# Patient Record
Sex: MALE | Race: OTHER | HISPANIC OR LATINO | Employment: FULL TIME | URBAN - NONMETROPOLITAN AREA
[De-identification: names, ages, dates, MRNs, and addresses within clinical notes are randomized per-mention and may not be internally consistent; named-entity substitution may affect disease eponyms.]

---

## 2021-09-15 ENCOUNTER — HOSPITAL ENCOUNTER (EMERGENCY)
Facility: HOSPITAL | Age: 56
Discharge: HOME | End: 2021-09-15
Attending: EMERGENCY MEDICINE
Payer: COMMERCIAL

## 2021-09-15 ENCOUNTER — APPOINTMENT (EMERGENCY)
Dept: CT IMAGING | Facility: HOSPITAL | Age: 56
End: 2021-09-15
Payer: COMMERCIAL

## 2021-09-15 VITALS
RESPIRATION RATE: 18 BRPM | OXYGEN SATURATION: 97 % | TEMPERATURE: 97.4 F | SYSTOLIC BLOOD PRESSURE: 122 MMHG | WEIGHT: 263.45 LBS | DIASTOLIC BLOOD PRESSURE: 75 MMHG | HEART RATE: 56 BPM

## 2021-09-15 DIAGNOSIS — R10.9 ABDOMINAL PAIN: Primary | ICD-10-CM

## 2021-09-15 DIAGNOSIS — K40.20 BILATERAL INGUINAL HERNIA WITHOUT OBSTRUCTION OR GANGRENE, RECURRENCE NOT SPECIFIED: ICD-10-CM

## 2021-09-15 DIAGNOSIS — N20.0 RENAL CALCULI: ICD-10-CM

## 2021-09-15 DIAGNOSIS — R11.2 NAUSEA AND VOMITING: ICD-10-CM

## 2021-09-15 DIAGNOSIS — Z98.84 PERSONAL HISTORY OF GASTRIC BYPASS: ICD-10-CM

## 2021-09-15 DIAGNOSIS — N18.30 CKD (CHRONIC KIDNEY DISEASE) STAGE 3, GFR 30-59 ML/MIN (HCC): ICD-10-CM

## 2021-09-15 LAB
ALBUMIN SERPL BCP-MCNC: 3.9 G/DL (ref 3.5–5)
ALP SERPL-CCNC: 100 U/L (ref 46–116)
ALT SERPL W P-5'-P-CCNC: 19 U/L (ref 12–78)
AMORPH PHOS CRY URNS QL MICRO: ABNORMAL /HPF
AMORPH URATE CRY URNS QL MICRO: ABNORMAL /HPF
ANION GAP SERPL CALCULATED.3IONS-SCNC: 9 MMOL/L (ref 4–13)
AST SERPL W P-5'-P-CCNC: 13 U/L (ref 5–45)
BACTERIA UR QL AUTO: ABNORMAL /HPF
BASOPHILS # BLD AUTO: 0.05 THOUSANDS/ΜL (ref 0–0.1)
BASOPHILS NFR BLD AUTO: 1 % (ref 0–1)
BILIRUB SERPL-MCNC: 0.85 MG/DL (ref 0.2–1)
BILIRUB UR QL STRIP: ABNORMAL
BUN SERPL-MCNC: 31 MG/DL (ref 5–25)
CALCIUM SERPL-MCNC: 9.4 MG/DL (ref 8.3–10.1)
CAOX CRY URNS QL MICRO: ABNORMAL /HPF
CHLORIDE SERPL-SCNC: 106 MMOL/L (ref 100–108)
CLARITY UR: ABNORMAL
CO2 SERPL-SCNC: 26 MMOL/L (ref 21–32)
COLOR UR: YELLOW
CREAT SERPL-MCNC: 1.68 MG/DL (ref 0.6–1.3)
EOSINOPHIL # BLD AUTO: 0.19 THOUSAND/ΜL (ref 0–0.61)
EOSINOPHIL NFR BLD AUTO: 2 % (ref 0–6)
ERYTHROCYTE [DISTWIDTH] IN BLOOD BY AUTOMATED COUNT: 14.3 % (ref 11.6–15.1)
GFR SERPL CREATININE-BSD FRML MDRD: 45 ML/MIN/1.73SQ M
GLUCOSE SERPL-MCNC: 112 MG/DL (ref 65–140)
GLUCOSE UR STRIP-MCNC: NEGATIVE MG/DL
HCT VFR BLD AUTO: 40.6 % (ref 36.5–49.3)
HGB BLD-MCNC: 13.3 G/DL (ref 12–17)
HGB UR QL STRIP.AUTO: NEGATIVE
IMM GRANULOCYTES # BLD AUTO: 0.04 THOUSAND/UL (ref 0–0.2)
IMM GRANULOCYTES NFR BLD AUTO: 0 % (ref 0–2)
KETONES UR STRIP-MCNC: ABNORMAL MG/DL
LEUKOCYTE ESTERASE UR QL STRIP: NEGATIVE
LIPASE SERPL-CCNC: 139 U/L (ref 73–393)
LYMPHOCYTES # BLD AUTO: 1.84 THOUSANDS/ΜL (ref 0.6–4.47)
LYMPHOCYTES NFR BLD AUTO: 19 % (ref 14–44)
MAGNESIUM SERPL-MCNC: 2.5 MG/DL (ref 1.6–2.6)
MCH RBC QN AUTO: 28.2 PG (ref 26.8–34.3)
MCHC RBC AUTO-ENTMCNC: 32.8 G/DL (ref 31.4–37.4)
MCV RBC AUTO: 86 FL (ref 82–98)
MONOCYTES # BLD AUTO: 1.04 THOUSAND/ΜL (ref 0.17–1.22)
MONOCYTES NFR BLD AUTO: 11 % (ref 4–12)
NEUTROPHILS # BLD AUTO: 6.68 THOUSANDS/ΜL (ref 1.85–7.62)
NEUTS SEG NFR BLD AUTO: 67 % (ref 43–75)
NITRITE UR QL STRIP: NEGATIVE
NON-SQ EPI CELLS URNS QL MICRO: ABNORMAL /HPF
NRBC BLD AUTO-RTO: 0 /100 WBCS
PH UR STRIP.AUTO: 6 [PH]
PLATELET # BLD AUTO: 258 THOUSANDS/UL (ref 149–390)
PMV BLD AUTO: 10 FL (ref 8.9–12.7)
POTASSIUM SERPL-SCNC: 3.7 MMOL/L (ref 3.5–5.3)
PROT SERPL-MCNC: 7.9 G/DL (ref 6.4–8.2)
PROT UR STRIP-MCNC: ABNORMAL MG/DL
RBC # BLD AUTO: 4.72 MILLION/UL (ref 3.88–5.62)
RBC #/AREA URNS AUTO: ABNORMAL /HPF
SARS-COV-2 RNA RESP QL NAA+PROBE: NEGATIVE
SODIUM SERPL-SCNC: 141 MMOL/L (ref 136–145)
SP GR UR STRIP.AUTO: >=1.03 (ref 1–1.03)
TROPONIN I SERPL-MCNC: <0.02 NG/ML
UROBILINOGEN UR QL STRIP.AUTO: 1 E.U./DL
WBC # BLD AUTO: 9.84 THOUSAND/UL (ref 4.31–10.16)
WBC #/AREA URNS AUTO: ABNORMAL /HPF

## 2021-09-15 PROCEDURE — 36415 COLL VENOUS BLD VENIPUNCTURE: CPT | Performed by: PHYSICIAN ASSISTANT

## 2021-09-15 PROCEDURE — 96374 THER/PROPH/DIAG INJ IV PUSH: CPT

## 2021-09-15 PROCEDURE — 74177 CT ABD & PELVIS W/CONTRAST: CPT

## 2021-09-15 PROCEDURE — 83690 ASSAY OF LIPASE: CPT | Performed by: PHYSICIAN ASSISTANT

## 2021-09-15 PROCEDURE — 93005 ELECTROCARDIOGRAM TRACING: CPT

## 2021-09-15 PROCEDURE — 80053 COMPREHEN METABOLIC PANEL: CPT | Performed by: PHYSICIAN ASSISTANT

## 2021-09-15 PROCEDURE — 96361 HYDRATE IV INFUSION ADD-ON: CPT

## 2021-09-15 PROCEDURE — 96375 TX/PRO/DX INJ NEW DRUG ADDON: CPT

## 2021-09-15 PROCEDURE — U0005 INFEC AGEN DETEC AMPLI PROBE: HCPCS | Performed by: PHYSICIAN ASSISTANT

## 2021-09-15 PROCEDURE — 83735 ASSAY OF MAGNESIUM: CPT | Performed by: PHYSICIAN ASSISTANT

## 2021-09-15 PROCEDURE — 84484 ASSAY OF TROPONIN QUANT: CPT | Performed by: PHYSICIAN ASSISTANT

## 2021-09-15 PROCEDURE — G1004 CDSM NDSC: HCPCS

## 2021-09-15 PROCEDURE — 99284 EMERGENCY DEPT VISIT MOD MDM: CPT

## 2021-09-15 PROCEDURE — U0003 INFECTIOUS AGENT DETECTION BY NUCLEIC ACID (DNA OR RNA); SEVERE ACUTE RESPIRATORY SYNDROME CORONAVIRUS 2 (SARS-COV-2) (CORONAVIRUS DISEASE [COVID-19]), AMPLIFIED PROBE TECHNIQUE, MAKING USE OF HIGH THROUGHPUT TECHNOLOGIES AS DESCRIBED BY CMS-2020-01-R: HCPCS | Performed by: PHYSICIAN ASSISTANT

## 2021-09-15 PROCEDURE — 81001 URINALYSIS AUTO W/SCOPE: CPT | Performed by: PHYSICIAN ASSISTANT

## 2021-09-15 PROCEDURE — 99285 EMERGENCY DEPT VISIT HI MDM: CPT | Performed by: PHYSICIAN ASSISTANT

## 2021-09-15 PROCEDURE — 85025 COMPLETE CBC W/AUTO DIFF WBC: CPT | Performed by: PHYSICIAN ASSISTANT

## 2021-09-15 RX ORDER — ONDANSETRON 2 MG/ML
4 INJECTION INTRAMUSCULAR; INTRAVENOUS ONCE
Status: COMPLETED | OUTPATIENT
Start: 2021-09-15 | End: 2021-09-15

## 2021-09-15 RX ORDER — ONDANSETRON 4 MG/1
4 TABLET, ORALLY DISINTEGRATING ORAL EVERY 6 HOURS PRN
Qty: 12 TABLET | Refills: 0 | Status: SHIPPED | OUTPATIENT
Start: 2021-09-15

## 2021-09-15 RX ADMIN — SODIUM CHLORIDE 1000 ML: 0.9 INJECTION, SOLUTION INTRAVENOUS at 16:18

## 2021-09-15 RX ADMIN — SODIUM CHLORIDE 1000 ML: 0.9 INJECTION, SOLUTION INTRAVENOUS at 17:46

## 2021-09-15 RX ADMIN — IOHEXOL 100 ML: 350 INJECTION, SOLUTION INTRAVENOUS at 18:45

## 2021-09-15 RX ADMIN — FAMOTIDINE 20 MG: 10 INJECTION INTRAVENOUS at 16:18

## 2021-09-15 RX ADMIN — ONDANSETRON 4 MG: 2 INJECTION INTRAMUSCULAR; INTRAVENOUS at 16:17

## 2021-09-15 NOTE — DISCHARGE INSTRUCTIONS
Rest, plenty of fluids  Zofran as needed for nausea/vomiting  OTC tylenol as needed for fever/discomfort  Follow up with PCP or return to ER as needed

## 2021-09-15 NOTE — ED PROVIDER NOTES
History  Chief Complaint   Patient presents with    Fever - 9 weeks to 74 years     pt c/o abdominal pain, fever, headache, and weakness for the past week  pts fever this morning was 8      64year old male presents with spouse ambulatory from home for evaluation of not feeling well  Symptoms started last week  C/o fever, chills, headache, body aches  Slight cough  Also reports upper abdominal pain, nausea and vomiting  Denies diarrhea or constipation  Pain does not radiate  Denies chest pain, SOB  Denies any urinary complaints  Appetite has been decreased  He has been able to tolerate fluids  No reported aggravating or alleviating factors  No specific treatments tried  Denies sick contacts  Denies recent travel  PMH unremarkable  Pt has had gastric bypass surgery in June of this year (2021)  History provided by:  Patient and spouse   used: No        None       History reviewed  No pertinent past medical history  Past Surgical History:   Procedure Laterality Date    GASTRIC BYPASS         History reviewed  No pertinent family history  I have reviewed and agree with the history as documented  E-Cigarette/Vaping    E-Cigarette Use Never User      E-Cigarette/Vaping Substances     Social History     Tobacco Use    Smoking status: Never Smoker    Smokeless tobacco: Never Used   Vaping Use    Vaping Use: Never used   Substance Use Topics    Alcohol use: Yes     Comment: socially    Drug use: Never       Review of Systems   Constitutional: Positive for chills, fatigue and fever  HENT: Negative  Negative for congestion, rhinorrhea and sore throat  Eyes: Negative  Negative for visual disturbance  Respiratory: Negative  Negative for cough, shortness of breath and wheezing  Cardiovascular: Negative  Negative for chest pain, palpitations and leg swelling  Gastrointestinal: Positive for abdominal pain, nausea and vomiting   Negative for constipation and diarrhea  Genitourinary: Negative  Negative for dysuria, flank pain, frequency and hematuria  Musculoskeletal: Positive for myalgias  Negative for back pain and neck pain  Skin: Negative  Negative for rash  Neurological: Positive for headaches  Negative for dizziness, light-headedness and numbness  Psychiatric/Behavioral: Negative  Negative for confusion  All other systems reviewed and are negative  Physical Exam  Physical Exam  Vitals and nursing note reviewed  Constitutional:       General: He is not in acute distress  Appearance: Normal appearance  He is well-developed  He is obese  He is not toxic-appearing or diaphoretic  HENT:      Head: Normocephalic and atraumatic  Right Ear: Hearing, tympanic membrane, ear canal and external ear normal       Left Ear: Hearing, tympanic membrane, ear canal and external ear normal       Nose: Nose normal       Mouth/Throat:      Mouth: Mucous membranes are moist       Pharynx: Oropharynx is clear  Uvula midline  No oropharyngeal exudate  Eyes:      General: Lids are normal  No scleral icterus  Extraocular Movements: Extraocular movements intact  Conjunctiva/sclera: Conjunctivae normal       Pupils: Pupils are equal, round, and reactive to light  Neck:      Trachea: Trachea and phonation normal  No tracheal deviation  Cardiovascular:      Rate and Rhythm: Normal rate and regular rhythm  Pulses: Normal pulses  Heart sounds: Normal heart sounds, S1 normal and S2 normal  No murmur heard  Pulmonary:      Effort: Pulmonary effort is normal  No tachypnea or respiratory distress  Breath sounds: Normal breath sounds  No wheezing, rhonchi or rales  Abdominal:      General: Bowel sounds are normal       Palpations: Abdomen is soft  Tenderness: There is abdominal tenderness (mild) in the epigastric area  There is no right CVA tenderness, left CVA tenderness, guarding or rebound     Musculoskeletal:      Cervical back: Normal range of motion and neck supple  Right lower leg: No edema  Left lower leg: No edema  Skin:     General: Skin is warm and dry  Capillary Refill: Capillary refill takes less than 2 seconds  Findings: No rash  Neurological:      General: No focal deficit present  Mental Status: He is alert and oriented to person, place, and time  GCS: GCS eye subscore is 4  GCS verbal subscore is 5  GCS motor subscore is 6  Cranial Nerves: No cranial nerve deficit  Sensory: Sensation is intact  No sensory deficit  Motor: Motor function is intact  No weakness        Gait: Gait normal    Psychiatric:         Mood and Affect: Mood normal          Speech: Speech normal          Behavior: Behavior normal          Vital Signs  ED Triage Vitals [09/15/21 1540]   Temperature Pulse Respirations Blood Pressure SpO2   (!) 97 4 °F (36 3 °C) (!) 49 16 123/71 96 %      Temp Source Heart Rate Source Patient Position - Orthostatic VS BP Location FiO2 (%)   Tympanic Monitor Lying Left arm --      Pain Score       7           Vitals:    09/15/21 1540 09/15/21 1700 09/15/21 1948 09/15/21 2008   BP: 123/71 117/74 126/78 122/75   Pulse: (!) 49 (!) 51 55 56   Patient Position - Orthostatic VS: Lying            Visual Acuity      ED Medications  Medications   iohexol (OMNIPAQUE) 240 MG/ML solution 50 mL (has no administration in time range)   ondansetron (ZOFRAN) injection 4 mg (4 mg Intravenous Given 9/15/21 1617)   sodium chloride 0 9 % bolus 1,000 mL (0 mL Intravenous Stopped 9/15/21 1718)   famotidine (PEPCID) injection 20 mg (20 mg Intravenous Given 9/15/21 1618)   sodium chloride 0 9 % bolus 1,000 mL (0 mL Intravenous Stopped 9/15/21 1846)   iohexol (OMNIPAQUE) 350 MG/ML injection (SINGLE-DOSE) 100 mL (100 mL Intravenous Given 9/15/21 1845)       Diagnostic Studies  Results Reviewed     Procedure Component Value Units Date/Time    Urine Microscopic [[id number]]  (Abnormal) Collected: 09/15/21 1747    Lab Status: Final result Specimen: Urine, Clean Catch Updated: 09/15/21 1838     RBC, UA None Seen /hpf      WBC, UA None Seen /hpf      Epithelial Cells Occasional /hpf      Bacteria, UA Occasional /hpf      AMORPH URATES Occasional /hpf      AMORPH PHOSPATES Moderate /hpf      Ca Oxalate Monica, UA Occasional /hpf     UA (URINE) with reflex to Scope [[id number]]  (Abnormal) Collected: 09/15/21 1747    Lab Status: Final result Specimen: Urine, Clean Catch Updated: 09/15/21 1816     Color, UA Yellow     Clarity, UA Slightly Cloudy     Specific Gravity, UA >=1 030     pH, UA 6 0     Leukocytes, UA Negative     Nitrite, UA Negative     Protein, UA 30 (1+) mg/dl      Glucose, UA Negative mg/dl      Ketones, UA Trace mg/dl      Urobilinogen, UA 1 0 E U /dl      Bilirubin, UA Interference- unable to analyze     Blood, UA Negative    Novel Coronavirus (Covid-19),PCR [facility] - 2 Hour Stat [[id number]]  (Normal) Collected: 09/15/21 1616    Lab Status: Final result Specimen: Nasopharyngeal Swab Updated: 09/15/21 1739     SARS-CoV-2 Negative    Narrative: The specimen collection materials, transport medium, and/or testing methodology utilized in the production of these test results have been proven to be reliable in a limited validation with an abbreviated program under the Emergency Utilization Authorization provided by the FDA  Testing reported as "Presumptive positive" will be confirmed with secondary testing to ensure result accuracy  Clinical caution and judgement should be used with the interpretation of these results with consideration of the clinical impression and other laboratory testing  Testing reported as "Positive" or "Negative" has been proven to be accurate according to standard laboratory validation requirements  All testing is performed with control materials showing appropriate reactivity at standard intervals        Comprehensive metabolic panel [[id number]]  (Abnormal) Collected: 09/15/21 1616 Lab Status: Final result Specimen: Blood from Arm, Right Updated: 09/15/21 1705     Sodium 141 mmol/L      Potassium 3 7 mmol/L      Chloride 106 mmol/L      CO2 26 mmol/L      ANION GAP 9 mmol/L      BUN 31 mg/dL      Creatinine 1 68 mg/dL      Glucose 112 mg/dL      Calcium 9 4 mg/dL      AST 13 U/L      ALT 19 U/L      Alkaline Phosphatase 100 U/L      Total Protein 7 9 g/dL      Albumin 3 9 g/dL      Total Bilirubin 0 85 mg/dL      eGFR 45 ml/min/1 73sq m     Narrative:      [facility] guidelines for Chronic Kidney Disease (CKD):     Stage 1 with normal or high GFR (GFR > 90 mL/min/1 73 square meters)    Stage 2 Mild CKD (GFR = 60-89 mL/min/1 73 square meters)    Stage 3A Moderate CKD (GFR = 45-59 mL/min/1 73 square meters)    Stage 3B Moderate CKD (GFR = 30-44 mL/min/1 73 square meters)    Stage 4 Severe CKD (GFR = 15-29 mL/min/1 73 square meters)    Stage 5 End Stage CKD (GFR <15 mL/min/1 73 square meters)  Note: GFR calculation is accurate only with a steady state creatinine    Troponin I [[id number]]  (Normal) Collected: 09/15/21 1616    Lab Status: Final result Specimen: Blood from Arm, Right Updated: 09/15/21 1704     Troponin I <0 02 ng/mL     Lipase [[id number]]  (Normal) Collected: 09/15/21 1616    Lab Status: Final result Specimen: Blood from Arm, Right Updated: 09/15/21 1657     Lipase 139 u/L     Magnesium [[id number]]  (Normal) Collected: 09/15/21 1616    Lab Status: Final result Specimen: Blood from Arm, Right Updated: 09/15/21 1657     Magnesium 2 5 mg/dL     CBC and differential [[id number]] Collected: 09/15/21 1616    Lab Status: Final result Specimen: Blood from Arm, Right Updated: 09/15/21 1634     WBC 9 84 Thousand/uL      RBC 4 72 Million/uL      Hemoglobin 13 3 g/dL      Hematocrit 40 6 %      MCV 86 fL      MCH 28 2 pg      MCHC 32 8 g/dL      RDW 14 3 %      MPV 10 0 fL      Platelets 628 Thousands/uL      nRBC 0 /100 WBCs      Neutrophils Relative 67 % Immat GRANS % 0 %      Lymphocytes Relative 19 %      Monocytes Relative 11 %      Eosinophils Relative 2 %      Basophils Relative 1 %      Neutrophils Absolute 6 68 Thousands/µL      Immature Grans Absolute 0 04 Thousand/uL      Lymphocytes Absolute 1 84 Thousands/µL      Monocytes Absolute 1 04 Thousand/µL      Eosinophils Absolute 0 19 Thousand/µL      Basophils Absolute 0 05 Thousands/µL                  CT abdomen pelvis with contrast   Final Result by Patty Guidry MD (09/15 1927)      No acute intra-abdominal finding  Workstation performed: [id number]                    Procedures  ECG 12 Lead Documentation Only    Date/Time: 9/15/2021 4:30 PM  Performed by: Melene Holstein, PA-C  Authorized by: Melene Holstein, PA-C     Indications / Diagnosis:  Abd pain  ECG reviewed by me, the ED Provider: yes    Patient location:  ED  Previous ECG:     Previous ECG:  Unavailable    Comparison to cardiac monitor: Yes    Interpretation:     Interpretation: abnormal    Rate:     ECG rate:  50    ECG rate assessment: bradycardic    Rhythm:     Rhythm: sinus bradycardia    Ectopy:     Ectopy: none    QRS:     QRS axis:  Normal    QRS intervals:  Normal  Conduction:     Conduction: normal    ST segments:     ST segments:  Non-specific  T waves:     T waves: non-specific    Comments:      , , QT/QTc 504/459; no prior EKG for comparison  ED Course  ED Course as of Sep 15 2140   Wed Sep 15, 2021   1652 WBC: 9 84   1652 Hemoglobin: 13 3   1652 Platelet Count: 699   1659 Magnesium: 2 5   1700 Lipase: 139   1712 Glucose, Random: 112   1712 No prior for comparison; will hydrate  I was able to locate records from [facility] and last Cr was 1 87 on 6/30/21 and therefore improved     Creatinine(!): 1 68   1712 BUN(!): 31   1712 Sodium: 141   1712 Potassium: 3 7   1712 Chloride: 106   1712 CO2: 26   1712 Anion Gap: 9   1712 Calcium: 9 4   1712 AST: 13   1712 ALT: 19   1712 Alkaline Phosphatase: 100 1712 Total Protein: 7 9   1712 Albumin: 3 9   1712 TOTAL BILIRUBIN: 0 85   1712 eGFR: 45   1712 Troponin I: <0 02   1740 SARS-COV-2: Negative   1818 POCT URINE PROTEIN(!): 30 (1+)   1818 Ketones, UA(!): Trace   1819 Bilirubin, UA(!): Interference- unable to analyze   1819 UA shows 1+ protein, trace ketones likely due to nausea/vomiting      1907 CT performed and pending interpretation  1930 Pt ambulated to the bathroom, gait steady  1933 IMPRESSION:     No acute intra-abdominal finding  CT abdomen pelvis with contrast   1941 Pt and spouse updated on all results  He reports feeling improved  No emesis while here and tolerating PO  Discussed incidental findings  Kidney function appears to be at baseline  Nothing infectious found on work up  Discussed other etiologies such as GERD, possible stomach bug, etc   Pt afebrile, well appearing  No significant tenderness on exam   Noted to have b/l fat containing inguinal hernias on CT - not symptomatic in this regards and not likely etiology of his symptoms  This could be followed up as an outpatient  Pt has small kidney stones, but non obstructing and therefore not causing any symptoms  Will discharge with outpatient follow up  Pt reports feeling significantly improved after fluids  Pt afebrile and hemodynamically stable  No peritoneal signs on exam   Advised rest, fluids  Rx zofran PRN nausea/vomiting  Consider GI evaluation if having any persistent symptoms  Strict return precautions outlined  Advised outpatient follow up with PCP or return to ER for change in condition as outlined  Pt and spouse verbalized understanding and had no further questions            HEART Risk Score      Most Recent Value   Heart Score Risk Calculator   History  0 Filed at: 09/15/2021 1715   ECG  1 Filed at: 09/15/2021 1715   Age  1 Filed at: 09/15/2021 1715   Risk Factors  1 Filed at: 09/15/2021 1715   Troponin  0 Filed at: 09/15/2021 1715   HEART Score  3 Filed at: 09/15/2021 3399                                    [facility]  Number of Diagnoses or Management Options  Abdominal pain: new and requires workup  CKD (chronic kidney disease) stage 3, GFR 30-59 ml/min ([facility] [address] ): established and improving  Nausea and vomiting: new and requires workup  Renal calculi: new and requires workup     Amount and/or Complexity of Data Reviewed  Clinical lab tests: ordered and reviewed  Tests in the radiology section of CPT®: ordered and reviewed  Decide to obtain previous medical records or to obtain history from someone other than the patient: yes  Obtain history from someone other than the patient: yes  Review and summarize past medical records: yes  Independent visualization of images, tracings, or specimens: yes    Patient Progress  Patient progress: improved      Disposition  Final diagnoses:   Abdominal pain   Nausea and vomiting   Personal history of gastric bypass   CKD (chronic kidney disease) stage 3, GFR 30-59 ml/min ([facility])   Renal calculi   Bilateral inguinal hernia without obstruction or gangrene, recurrence not specified - fat containing     Time reflects when diagnosis was documented in both MDM as applicable and the Disposition within this note     Time User Action Codes Description Comment    9/15/2021  7:31 PM Merilynn Carte Add [R10 9] Abdominal pain     9/15/2021  7:31 PM Merilynn Carte Add [R11 2] Nausea and vomiting     9/15/2021  7:31 PM Merilynn Carte Add [Y97 69] Personal history of gastric bypass     9/15/2021  7:31 PM Merilynn Carte Add [N18 30] CKD (chronic kidney disease) stage 3, GFR 30-59 ml/min ([facility] [address] )     9/15/2021  7:31 PM Merilynn Carte Add [N20 0] Renal calculi     9/15/2021  7:32 PM Merilynn Carte Add [K40 20] Bilateral inguinal hernia without obstruction or gangrene, recurrence not specified     9/15/2021  7:32 PM Merilynn Carte Modify [K40 20] Bilateral inguinal hernia without obstruction or gangrene, recurrence not specified fat containing ED Disposition     ED Disposition Condition Date/Time Comment    Discharge Stable Wed Sep 15, 2021  7:47 PM Oral Elizabeth discharge to home/self care  Follow-up Information     Follow up With Specialties Details Why Contact Info Additional [address] [facility] Family Medicine Schedule an appointment as soon as possible for a visit   [address] [facility] Emergency Department Emergency Medicine  As needed [facility] [phone]  70 [facility] Emergency Department, [address], [address]          Discharge Medication List as of 9/15/2021  7:52 PM      START taking these medications    Details   ondansetron (ZOFRAN-ODT) 4 mg disintegrating tablet Take 1 tablet (4 mg total) by mouth every 6 (six) hours as needed for nausea or vomiting, Starting Wed 9/15/2021, Print           No discharge procedures on file      PDMP Review     None          ED Provider  Electronically Signed by           Naina Marin PA-C  09/15/21 1382

## 2021-09-16 LAB
ATRIAL RATE: 50 BPM
ATRIAL RATE: 51 BPM
P AXIS: 49 DEGREES
P AXIS: 68 DEGREES
PR INTERVAL: 138 MS
PR INTERVAL: 152 MS
QRS AXIS: -1 DEGREES
QRS AXIS: -2 DEGREES
QRSD INTERVAL: 102 MS
QRSD INTERVAL: 102 MS
QT INTERVAL: 498 MS
QT INTERVAL: 504 MS
QTC INTERVAL: 458 MS
QTC INTERVAL: 459 MS
T WAVE AXIS: -75 DEGREES
T WAVE AXIS: 263 DEGREES
VENTRICULAR RATE: 50 BPM
VENTRICULAR RATE: 51 BPM

## 2021-09-16 PROCEDURE — 93010 ELECTROCARDIOGRAM REPORT: CPT | Performed by: INTERNAL MEDICINE

## 2023-01-12 ENCOUNTER — HOSPITAL ENCOUNTER (EMERGENCY)
Facility: HOSPITAL | Age: 58
Discharge: HOME | End: 2023-01-12
Attending: EMERGENCY MEDICINE

## 2023-01-12 VITALS
RESPIRATION RATE: 16 BRPM | SYSTOLIC BLOOD PRESSURE: 167 MMHG | DIASTOLIC BLOOD PRESSURE: 98 MMHG | TEMPERATURE: 98 F | OXYGEN SATURATION: 95 % | WEIGHT: 268 LBS | HEART RATE: 89 BPM

## 2023-01-12 DIAGNOSIS — J06.9 URI (UPPER RESPIRATORY INFECTION): Primary | ICD-10-CM

## 2023-01-12 LAB
FLUAV RNA RESP QL NAA+PROBE: NEGATIVE
FLUBV RNA RESP QL NAA+PROBE: NEGATIVE
RSV RNA RESP QL NAA+PROBE: NEGATIVE
SARS-COV-2 RNA RESP QL NAA+PROBE: NEGATIVE

## 2023-01-12 NOTE — ED PROVIDER NOTES
History  Chief Complaint   Patient presents with   • Cold Like Symptoms     Patient presents with cold like symptoms since Sunday  Patient complains of N/V/D, fever, chills, and cough  He would like to be tested for covid  Patient presents to the emergency department today with family  History is shared between family as well as the patient  Over the last 1 week he has felt as though he has been fatigued he has had nasal congestion he is coughing up mucus he feels that he has had fever and body aches  He was at a separate facility today and tested for COVID, they told him that the results were going to take 48 hours therefore they came here for a quicker test   Patient's oxygen saturation 95% on room air  No tachycardia here afebrile  I offered a further work-up with IV blood work imaging EKG is they are refusing they are just looking for a COVID test then be discharged home  Prior to Admission Medications   Prescriptions Last Dose Informant Patient Reported? Taking?   ondansetron (ZOFRAN-ODT) 4 mg disintegrating tablet   No No   Sig: Take 1 tablet (4 mg total) by mouth every 6 (six) hours as needed for nausea or vomiting      Facility-Administered Medications: None       History reviewed  No pertinent past medical history  Past Surgical History:   Procedure Laterality Date   • GASTRIC BYPASS         History reviewed  No pertinent family history  I have reviewed and agree with the history as documented  E-Cigarette/Vaping   • E-Cigarette Use Never User      E-Cigarette/Vaping Substances     Social History     Tobacco Use   • Smoking status: Never   • Smokeless tobacco: Never   Vaping Use   • Vaping Use: Never used   Substance Use Topics   • Alcohol use: Yes     Comment: socially   • Drug use: Never       Review of Systems   Constitutional: Positive for fatigue and fever  Negative for activity change, appetite change, chills, diaphoresis and unexpected weight change     HENT: Positive for congestion and sore throat  Negative for trouble swallowing and voice change  Eyes: Negative  Respiratory: Positive for cough  Negative for chest tightness, shortness of breath and wheezing  Cardiovascular: Negative  Negative for chest pain, palpitations and leg swelling  Gastrointestinal: Negative  Negative for abdominal pain, blood in stool, nausea and vomiting  Endocrine: Negative  Genitourinary: Negative  Negative for flank pain and hematuria  Musculoskeletal: Negative  Negative for arthralgias, back pain, gait problem, joint swelling, myalgias, neck pain and neck stiffness  Skin: Negative  Negative for rash and wound  Allergic/Immunologic: Negative  Neurological: Positive for headaches  Negative for dizziness, seizures, syncope, weakness and light-headedness  Hematological: Negative  Psychiatric/Behavioral: Negative  All other systems reviewed and are negative  Physical Exam  Physical Exam  Vitals reviewed  Constitutional:       General: He is not in acute distress  Appearance: He is well-developed  He is not ill-appearing or diaphoretic  HENT:      Right Ear: External ear normal  No swelling  Tympanic membrane is not bulging  Left Ear: External ear normal  No swelling  Tympanic membrane is not bulging  Nose: Nose normal       Mouth/Throat:      Pharynx: No oropharyngeal exudate  Eyes:      General: Lids are normal       Conjunctiva/sclera: Conjunctivae normal       Pupils: Pupils are equal, round, and reactive to light  Neck:      Thyroid: No thyromegaly  Vascular: No JVD  Trachea: No tracheal deviation  Cardiovascular:      Rate and Rhythm: Normal rate  Rhythm irregular  Pulses: Normal pulses  Heart sounds: Normal heart sounds  No murmur heard  No friction rub  No gallop  Pulmonary:      Effort: Pulmonary effort is normal  No respiratory distress  Breath sounds: Normal breath sounds  No stridor   No wheezing, rhonchi or rales  Chest:      Chest wall: No tenderness  Abdominal:      General: Bowel sounds are normal  There is no distension  Palpations: Abdomen is soft  There is no mass  Tenderness: There is no abdominal tenderness  There is no guarding or rebound  Negative signs include Lyle's sign  Hernia: No hernia is present  Musculoskeletal:         General: No tenderness  Normal range of motion  Cervical back: Normal range of motion and neck supple  No edema  Normal range of motion  Lymphadenopathy:      Cervical: No cervical adenopathy  Skin:     General: Skin is warm and dry  Capillary Refill: Capillary refill takes less than 2 seconds  Coloration: Skin is not pale  Findings: No erythema or rash  Neurological:      Mental Status: He is alert and oriented to person, place, and time  GCS: GCS eye subscore is 4  GCS verbal subscore is 5  GCS motor subscore is 6  Cranial Nerves: No cranial nerve deficit  Sensory: No sensory deficit  Deep Tendon Reflexes: Reflexes are normal and symmetric     Psychiatric:         Speech: Speech normal          Behavior: Behavior normal          Vital Signs  ED Triage Vitals [01/12/23 1734]   Temperature Pulse Respirations Blood Pressure SpO2   98 °F (36 7 °C) 89 16 167/98 95 %      Temp Source Heart Rate Source Patient Position - Orthostatic VS BP Location FiO2 (%)   Tympanic Monitor Sitting Right arm --      Pain Score       No Pain           Vitals:    01/12/23 1734   BP: 167/98   Pulse: 89   Patient Position - Orthostatic VS: Sitting         Visual Acuity      ED Medications  Medications - No data to display    Diagnostic Studies  Results Reviewed     Procedure Component Value Units Date/Time    FLU/RSV/COVID - if FLU/RSV clinically relevant [[id number]]     Lab Status: No result Specimen: Nares from Nose                  No orders to display              Procedures  Procedures         ED Course  ED Course as of 01/12/23 1758   Thu Jan 12, 2023   1741 SpO2: 95 %   1741 Respirations: 16   1741 Pulse: 89   1741 Temperature: 98 °F (36 7 °C)   1741 Blood Pressure: 167/98                                             MDM    Disposition  Final diagnoses:   URI (upper respiratory infection)     Time reflects when diagnosis was documented in both MDM as applicable and the Disposition within this note     Time User Action Codes Description Comment    1/12/2023  5:58 PM Doristine Ganser D Add [J06 9] URI (upper respiratory infection)       ED Disposition     ED Disposition   Discharge    Condition   Stable    Date/Time   Thu Jan 12, 2023  5:58 PM    Laz Sweeney 54 discharge to home/self care  Follow-up Information     Follow up With Specialties Details Why [id number] [facility] Medicine Schedule an appointment as soon as possible for a visit  As needed [address] [place] [address]  [phone]            Patient's Medications   Discharge Prescriptions    No medications on file       No discharge procedures on file      PDMP Review     None          ED Provider  Electronically Signed by           Moose Dakwins PA-C  01/12/23 1758

## 2023-01-16 ENCOUNTER — TELEPHONE (OUTPATIENT)
Dept: OTHER | Facility: OTHER | Age: 58
End: 2023-01-16

## 2023-01-16 NOTE — TELEPHONE ENCOUNTER
Patient's EC, Oletha Mess called in for patient's results from Covid/RSV/Infuenza test on 1/12/23  Patient is negative for all  Oletha Mess requested results be mailed to her  PCP is not [facility] provider  Triage RN got email address for patient and sent link to set up My Chart account so patient can access results and print out